# Patient Record
Sex: FEMALE | Race: WHITE | NOT HISPANIC OR LATINO | Employment: UNEMPLOYED | URBAN - NONMETROPOLITAN AREA
[De-identification: names, ages, dates, MRNs, and addresses within clinical notes are randomized per-mention and may not be internally consistent; named-entity substitution may affect disease eponyms.]

---

## 2019-08-20 ENCOUNTER — NON-PROVIDER VISIT (OUTPATIENT)
Dept: URGENT CARE | Facility: PHYSICIAN GROUP | Age: 29
End: 2019-08-20

## 2019-08-20 DIAGNOSIS — Z02.1 PRE-EMPLOYMENT DRUG SCREENING: ICD-10-CM

## 2019-08-20 LAB
AMP AMPHETAMINE: NORMAL
COC COCAINE: NORMAL
INT CON NEG: NORMAL
INT CON POS: NORMAL
MET METHAMPHETAMINES: NORMAL
OPI OPIATES: NORMAL
PCP PHENCYCLIDINE: NORMAL
POC DRUG COMMENT 753798-OCCUPATIONAL HEALTH: NEGATIVE
THC: NORMAL

## 2019-08-20 PROCEDURE — 80305 DRUG TEST PRSMV DIR OPT OBS: CPT | Performed by: PHYSICIAN ASSISTANT

## 2022-12-13 ENCOUNTER — OFFICE VISIT (OUTPATIENT)
Dept: URGENT CARE | Facility: PHYSICIAN GROUP | Age: 32
End: 2022-12-13
Payer: MEDICAID

## 2022-12-13 VITALS
WEIGHT: 126 LBS | DIASTOLIC BLOOD PRESSURE: 70 MMHG | HEIGHT: 67 IN | HEART RATE: 109 BPM | RESPIRATION RATE: 20 BRPM | BODY MASS INDEX: 19.78 KG/M2 | OXYGEN SATURATION: 100 % | SYSTOLIC BLOOD PRESSURE: 106 MMHG | TEMPERATURE: 100.2 F

## 2022-12-13 DIAGNOSIS — B34.9 VIRAL ILLNESS: ICD-10-CM

## 2022-12-13 DIAGNOSIS — R68.89 FLU-LIKE SYMPTOMS: ICD-10-CM

## 2022-12-13 LAB
EXTERNAL QUALITY CONTROL: NORMAL
INT CON NEG: NORMAL
INT CON NEG: NORMAL
INT CON POS: NORMAL
INT CON POS: NORMAL
S PYO AG THROAT QL: NORMAL
SARS-COV+SARS-COV-2 AG RESP QL IA.RAPID: NEGATIVE

## 2022-12-13 PROCEDURE — 99203 OFFICE O/P NEW LOW 30 MIN: CPT | Performed by: PHYSICIAN ASSISTANT

## 2022-12-13 PROCEDURE — 87880 STREP A ASSAY W/OPTIC: CPT | Performed by: PHYSICIAN ASSISTANT

## 2022-12-13 PROCEDURE — 87426 SARSCOV CORONAVIRUS AG IA: CPT | Performed by: PHYSICIAN ASSISTANT

## 2022-12-13 ASSESSMENT — ENCOUNTER SYMPTOMS
COUGH: 1
SORE THROAT: 1
DIARRHEA: 1
VOMITING: 0
NAUSEA: 0
MYALGIAS: 1
ABDOMINAL PAIN: 0
FEVER: 1

## 2022-12-13 NOTE — PROGRESS NOTES
"  Subjective:   Emy Leary is a 32 y.o. female who presents today with   Chief Complaint   Patient presents with    Cough    Fever    Sore Throat    Runny Nose       Cough  This is a new problem. Episode onset: 3 days. The problem has been unchanged. The problem occurs every few minutes. Associated symptoms include a fever, myalgias and a sore throat. Associated symptoms comments: Runny nose. Treatments tried: tylenol, ibuprofen. The treatment provided mild relief.     PMH:  has no past medical history on file.  MEDS:   Current Outpatient Medications:     PROGESTERONE IM, 1 mL by Intramuscular route every bedtime. (Patient not taking: Reported on 12/13/2022), Disp: , Rfl:     Multiple Vitamins-Minerals (MULTI COMPLETE/IRON PO), Take 1 tablet by mouth. (Patient not taking: Reported on 12/13/2022), Disp: , Rfl:     progesterone (ENDOMETRIN) 100 MG vaginal insert, Insert 100 mg in vagina 2 times a day. (Patient not taking: Reported on 12/13/2022), Disp: , Rfl:   ALLERGIES:   Allergies   Allergen Reactions    Pertussis Vaccines Anaphylaxis     seizures     SURGHX: No past surgical history on file.  SOCHX:  reports that she has never smoked. She has never used smokeless tobacco. She reports that she does not currently use alcohol.  FH: Reviewed with patient, not pertinent to this visit.     Review of Systems   Constitutional:  Positive for fever.   HENT:  Positive for sore throat.         Runny nose   Respiratory:  Positive for cough.    Gastrointestinal:  Positive for diarrhea. Negative for abdominal pain, nausea and vomiting.   Musculoskeletal:  Positive for myalgias.      Objective:   /70   Pulse (!) 109   Temp 37.9 °C (100.2 °F) (Temporal)   Resp 20   Ht 1.702 m (5' 7\")   Wt 57.2 kg (126 lb)   SpO2 100%   BMI 19.73 kg/m²   Physical Exam  Vitals and nursing note reviewed.   Constitutional:       General: She is not in acute distress.     Appearance: Normal appearance. She is well-developed. She is " not ill-appearing or toxic-appearing.   HENT:      Head: Normocephalic and atraumatic.      Right Ear: Hearing, tympanic membrane and ear canal normal.      Left Ear: Hearing, tympanic membrane and ear canal normal.      Mouth/Throat:      Mouth: Mucous membranes are moist.      Pharynx: Uvula midline. Posterior oropharyngeal erythema present.      Tonsils: No tonsillar exudate or tonsillar abscesses.   Cardiovascular:      Rate and Rhythm: Normal rate and regular rhythm.      Heart sounds: Normal heart sounds.   Pulmonary:      Effort: Pulmonary effort is normal.      Breath sounds: Normal breath sounds. No stridor. No wheezing, rhonchi or rales.   Musculoskeletal:      Comments: Normal movement in all 4 extremities   Skin:     General: Skin is warm and dry.   Neurological:      Mental Status: She is alert.      Coordination: Coordination normal.   Psychiatric:         Mood and Affect: Mood normal.     STREP A -  COVID -    Assessment/Plan:   Assessment    1. Flu-like symptoms  - POCT Rapid Strep A  - POCT SARS-COV Antigen SETH (Symptomatic only)    2. Viral illness    Symptoms and presentation consistent with viral illness and we will rule out COVID at this time.  Vital signs are stable on exam today.  Discussed CDC guidelines including self isolation at home.   Patient encouraged to get plenty of rest, use OTC tylenol for pain/fever, and drink plenty of fluids.    Discussed with patient that flu and RSV are also on the differential but testing would not change our plan of care given that she is outside of the window for Tamiflu at this time and she is agreeable.  Differential diagnosis, natural history, supportive care, and indications for immediate follow-up discussed.   Patient given instructions and understanding of medications and treatment.    If not improving in 3-5 days, F/U with PCP or return to  if symptoms worsen.    Patient agreeable to plan.    Please note that this dictation was created using voice  recognition software. I have made every reasonable attempt to correct obvious errors, but I expect that there are errors of grammar and possibly content that I did not discover before finalizing the note.    Diogo Berry PA-C

## 2023-08-04 ENCOUNTER — OFFICE VISIT (OUTPATIENT)
Dept: URGENT CARE | Facility: PHYSICIAN GROUP | Age: 33
End: 2023-08-04
Payer: MEDICAID

## 2023-08-04 VITALS
BODY MASS INDEX: 20.4 KG/M2 | RESPIRATION RATE: 14 BRPM | HEART RATE: 65 BPM | SYSTOLIC BLOOD PRESSURE: 118 MMHG | DIASTOLIC BLOOD PRESSURE: 72 MMHG | TEMPERATURE: 97.4 F | OXYGEN SATURATION: 98 % | WEIGHT: 130 LBS | HEIGHT: 67 IN

## 2023-08-04 DIAGNOSIS — Z3A.08 8 WEEKS GESTATION OF PREGNANCY: ICD-10-CM

## 2023-08-04 DIAGNOSIS — J01.90 ACUTE BACTERIAL SINUSITIS: ICD-10-CM

## 2023-08-04 DIAGNOSIS — B96.89 ACUTE BACTERIAL SINUSITIS: ICD-10-CM

## 2023-08-04 PROCEDURE — 99213 OFFICE O/P EST LOW 20 MIN: CPT | Performed by: NURSE PRACTITIONER

## 2023-08-04 PROCEDURE — 3078F DIAST BP <80 MM HG: CPT | Performed by: NURSE PRACTITIONER

## 2023-08-04 PROCEDURE — 3074F SYST BP LT 130 MM HG: CPT | Performed by: NURSE PRACTITIONER

## 2023-08-04 RX ORDER — AMOXICILLIN 500 MG/1
500 CAPSULE ORAL 2 TIMES DAILY
Qty: 14 CAPSULE | Refills: 0 | Status: SHIPPED | OUTPATIENT
Start: 2023-08-04 | End: 2023-08-11

## 2023-08-04 ASSESSMENT — ENCOUNTER SYMPTOMS
CHILLS: 1
SHORTNESS OF BREATH: 0
HEADACHES: 1
DIZZINESS: 0
SINUS PAIN: 1
SORE THROAT: 0
COUGH: 0
WHEEZING: 0
FEVER: 1

## 2023-08-05 NOTE — PROGRESS NOTES
"Subjective:   Emy Leary is a 33 y.o. female who presents for Sinusitis (X2-3 weeks Sinus pressure, tenderness, body aches, chills, fever, was seen at [facility] 2 weeks ago and -CV FLU and Strep has been doing OTC not helping she is 8 weeks pregnant. )    Patient is a 33-year-old female presents to clinic with 3-week history of worsening sinus pain, tenderness, body aches, chills, fever with high of 101.0, and headache.  Patient states that she was seen at the [facility] urgent care 2 weeks ago and has been using Tylenol and Mucinex without any symptom relief.  Patient was tested at that time for COVID, flu, and strep which were all negative.   She denies any cough, shortness of breath, dizziness, ear pain, or sore throat.  She is currently 8 weeks pregnant and denies any known complications at this time for her pregnancy.    Review of Systems   Constitutional:  Positive for chills, fever and malaise/fatigue.   HENT:  Positive for congestion and sinus pain. Negative for ear discharge, ear pain and sore throat.    Respiratory:  Negative for cough, shortness of breath and wheezing.    Neurological:  Positive for headaches. Negative for dizziness.       Medications, Allergies, and current problem list reviewed today in Epic.     Objective:     /72   Pulse 65   Temp 36.3 °C (97.4 °F) (Temporal)   Resp 14   Ht 1.702 m (5' 7\")   Wt 59 kg (130 lb)   SpO2 98%     Physical Exam  Vitals reviewed.   Constitutional:       General: She is not in acute distress.     Appearance: Normal appearance. She is ill-appearing. She is not toxic-appearing.   HENT:      Head: Normocephalic.      Right Ear: Tympanic membrane, ear canal and external ear normal.      Left Ear: Tympanic membrane, ear canal and external ear normal.      Nose: Mucosal edema, congestion and rhinorrhea present. Rhinorrhea is purulent.      Right Sinus: Maxillary sinus tenderness present. No frontal sinus tenderness.      Left Sinus: No maxillary sinus " tenderness or frontal sinus tenderness.        Mouth/Throat:      Lips: Pink.      Mouth: Mucous membranes are moist.      Pharynx: Oropharynx is clear. Uvula midline. No pharyngeal swelling, oropharyngeal exudate or posterior oropharyngeal erythema.      Tonsils: No tonsillar exudate.   Eyes:      Extraocular Movements: Extraocular movements intact.      Conjunctiva/sclera: Conjunctivae normal.      Pupils: Pupils are equal, round, and reactive to light.   Cardiovascular:      Rate and Rhythm: Normal rate and regular rhythm.   Pulmonary:      Effort: Pulmonary effort is normal.      Breath sounds: Normal breath sounds. No wheezing, rhonchi or rales.   Musculoskeletal:         General: Normal range of motion.      Cervical back: Normal range of motion and neck supple.   Skin:     General: Skin is warm and dry.   Neurological:      Mental Status: She is alert and oriented to person, place, and time.   Psychiatric:         Mood and Affect: Mood normal.         Behavior: Behavior normal.         Thought Content: Thought content normal.         Judgment: Judgment normal.         Assessment/Plan:     Diagnosis and associated orders:     1. Acute bacterial sinusitis  amoxicillin (AMOXIL) 500 MG Cap      2. 8 weeks gestation of pregnancy           Comments/MDM:     Patient is a very pleasant 33-year-old female presents to clinic today with acute bacterial sinusitis symptoms.  Discussed with patient at length treatment with antibiotics due to pregnancy and we will go ahead and start on amoxicillin as this has least amount of side effects and patient states she has tolerated well in the past.  Provided patient with information on the etiology & pathogenesis of bacterial sinusitis.   Recommend cool mist humidifier at home, use nasal saline wash (i.e. Nedi-Pot), may take OTC decongestant prn, and antibiotics as prescribed.   Tylenol/Motrin prn HA or discomfort.   Return to clinic for fever >4d, no improvement within 48-72h, or  for any other questions or concerns.          Differential diagnosis, natural history, supportive care, and indications for immediate follow-up discussed.    Advised the patient to follow-up with the primary care physician for recheck, reevaluation, and consideration of further management.    Please note that this dictation was created using voice recognition software. I have made a reasonable attempt to correct obvious errors, but I expect that there are errors of grammar and possibly content that I did not discover before finalizing the note.

## 2023-11-27 ENCOUNTER — ANCILLARY PROCEDURE (OUTPATIENT)
Dept: MATERNAL FETAL MEDICINE | Facility: MEDICAL CENTER | Age: 33
End: 2023-11-27
Attending: PHYSICIAN ASSISTANT
Payer: MEDICAID

## 2023-11-27 VITALS — WEIGHT: 157.3 LBS | DIASTOLIC BLOOD PRESSURE: 73 MMHG | SYSTOLIC BLOOD PRESSURE: 128 MMHG | BODY MASS INDEX: 24.64 KG/M2

## 2023-11-27 DIAGNOSIS — O36.5920: ICD-10-CM

## 2023-11-27 PROCEDURE — 76811 OB US DETAILED SNGL FETUS: CPT | Performed by: OBSTETRICS & GYNECOLOGY

## 2023-12-18 ENCOUNTER — ANCILLARY PROCEDURE (OUTPATIENT)
Dept: MATERNAL FETAL MEDICINE | Facility: MEDICAL CENTER | Age: 33
End: 2023-12-18
Payer: MEDICAID

## 2023-12-18 VITALS — SYSTOLIC BLOOD PRESSURE: 118 MMHG | WEIGHT: 159.9 LBS | BODY MASS INDEX: 25.04 KG/M2 | DIASTOLIC BLOOD PRESSURE: 65 MMHG

## 2023-12-18 DIAGNOSIS — O36.5920 SMALL FOR GESTATIONAL AGE FETUS AFFECTING MANAGEMENT OF MOTHER IN SINGLETON PREGNANCY IN SECOND TRIMESTER: ICD-10-CM

## 2023-12-18 PROCEDURE — 76816 OB US FOLLOW-UP PER FETUS: CPT | Performed by: OBSTETRICS & GYNECOLOGY

## 2024-01-29 ENCOUNTER — ANCILLARY PROCEDURE (OUTPATIENT)
Dept: MATERNAL FETAL MEDICINE | Facility: MEDICAL CENTER | Age: 34
End: 2024-01-29
Attending: OBSTETRICS & GYNECOLOGY
Payer: MEDICAID

## 2024-01-29 VITALS — WEIGHT: 169 LBS | BODY MASS INDEX: 26.47 KG/M2 | SYSTOLIC BLOOD PRESSURE: 130 MMHG | DIASTOLIC BLOOD PRESSURE: 80 MMHG

## 2024-01-29 DIAGNOSIS — O36.5920 POOR FETAL GROWTH COMPLICATING PREGNANCY, ANTEPARTUM, SECOND TRIMESTER, NOT APPLICABLE OR UNSPECIFIED FETUS: ICD-10-CM

## 2024-01-29 PROCEDURE — 76816 OB US FOLLOW-UP PER FETUS: CPT | Performed by: OBSTETRICS & GYNECOLOGY

## 2025-02-22 ENCOUNTER — OFFICE VISIT (OUTPATIENT)
Dept: URGENT CARE | Facility: PHYSICIAN GROUP | Age: 35
End: 2025-02-22
Payer: MEDICAID

## 2025-02-22 VITALS
WEIGHT: 136 LBS | TEMPERATURE: 99.3 F | BODY MASS INDEX: 21.35 KG/M2 | HEART RATE: 70 BPM | OXYGEN SATURATION: 99 % | SYSTOLIC BLOOD PRESSURE: 106 MMHG | DIASTOLIC BLOOD PRESSURE: 50 MMHG | RESPIRATION RATE: 16 BRPM | HEIGHT: 67 IN

## 2025-02-22 DIAGNOSIS — J01.00 ACUTE NON-RECURRENT MAXILLARY SINUSITIS: ICD-10-CM

## 2025-02-22 PROCEDURE — 3078F DIAST BP <80 MM HG: CPT | Performed by: NURSE PRACTITIONER

## 2025-02-22 PROCEDURE — 99213 OFFICE O/P EST LOW 20 MIN: CPT | Performed by: NURSE PRACTITIONER

## 2025-02-22 PROCEDURE — 3074F SYST BP LT 130 MM HG: CPT | Performed by: NURSE PRACTITIONER

## 2025-02-22 RX ORDER — METHYLPREDNISOLONE 4 MG/1
TABLET ORAL
Qty: 21 TABLET | Refills: 0 | Status: SHIPPED | OUTPATIENT
Start: 2025-02-22

## 2025-02-22 RX ORDER — FLUTICASONE PROPIONATE 50 MCG
1-2 SPRAY, SUSPENSION (ML) NASAL 2 TIMES DAILY
Qty: 16 G | Refills: 0 | Status: SHIPPED | OUTPATIENT
Start: 2025-02-22 | End: 2025-03-24

## 2025-02-22 NOTE — PROGRESS NOTES
"Subjective:   Emy Leary is a 34 y.o. female who presents for Sinusitis and Eye Drainage (watering)    Patient is a 34-year-old female presenting clinic today reporting 2 to 3-day history of right-sided maxillary sinus pressure, eye pressure, watery eye, and clear runny nose.  She has had associated symptoms of intermittent dizziness and headache.  Patient denies any sore throat, fevers, ear pain, or cough.    Medications, Allergies, and current problem list reviewed today in Epic.     Objective:     /50 (BP Location: Left arm, Patient Position: Sitting, BP Cuff Size: Adult)   Pulse 70   Temp 37.4 °C (99.3 °F) (Temporal)   Resp 16   Ht 1.702 m (5' 7\")   Wt 61.7 kg (136 lb)   SpO2 99%     Physical Exam  Vitals reviewed.   Constitutional:       General: She is not in acute distress.     Appearance: Normal appearance. She is not ill-appearing or toxic-appearing.   HENT:      Head: Normocephalic.      Right Ear: Tympanic membrane, ear canal and external ear normal.      Left Ear: Tympanic membrane, ear canal and external ear normal.      Nose: Mucosal edema and rhinorrhea present. No congestion. Rhinorrhea is clear.      Right Turbinates: Swollen. Not enlarged or pale.      Left Turbinates: Not enlarged, swollen or pale.      Right Sinus: Maxillary sinus tenderness present. No frontal sinus tenderness.      Left Sinus: No maxillary sinus tenderness or frontal sinus tenderness.      Mouth/Throat:      Mouth: Mucous membranes are moist.   Eyes:      Extraocular Movements: Extraocular movements intact.      Conjunctiva/sclera: Conjunctivae normal.      Pupils: Pupils are equal, round, and reactive to light.   Cardiovascular:      Rate and Rhythm: Normal rate.   Pulmonary:      Effort: Pulmonary effort is normal.   Musculoskeletal:         General: Normal range of motion.      Cervical back: Normal range of motion and neck supple.   Lymphadenopathy:      Cervical: No cervical adenopathy.   Skin:     " General: Skin is warm and dry.   Neurological:      Mental Status: She is alert and oriented to person, place, and time.   Psychiatric:         Mood and Affect: Mood normal.         Behavior: Behavior normal.         Thought Content: Thought content normal.         Judgment: Judgment normal.         Assessment/Plan:     Diagnosis and associated orders:     1. Acute non-recurrent maxillary sinusitis  fluticasone (FLONASE) 50 MCG/ACT nasal spray    methylPREDNISolone (MEDROL DOSEPAK) 4 MG Tablet Therapy Pack         Comments/MDM:     This is an acute condition.  Patient presents clinic today with sinusitis, most likely allergic or possibly viral.  No signs of bacterial infection.  Flonase prescribed.  Recommended over-the-counter Sudafed, and ibuprofen.  If symptoms or not improving Medrol Dosepak was prescribed and patient may start in 2 to 3 days.  Recommended following back up in clinic if symptoms acutely worsen or if they are not improving in the next 5 to 7 days.  Patient was involved with shared decision-making throughout the exam today and verbalizes understanding regards to plan of care, discharge instructions, and follow-up         Differential diagnosis, natural history, supportive care, and indications for immediate follow-up discussed.    Advised the patient to follow-up with the primary care physician for recheck, reevaluation, and consideration of further management.    I personally reviewed prior external notes and test results pertinent to today's visit as well as additional imaging and testing completed in clinic today.     Please note that this dictation was created using voice recognition software. I have made a reasonable attempt to correct obvious errors, but I expect that there are errors of grammar and possibly content that I did not discover before finalizing the note.